# Patient Record
Sex: MALE | Race: WHITE | HISPANIC OR LATINO | Employment: STUDENT | URBAN - METROPOLITAN AREA
[De-identification: names, ages, dates, MRNs, and addresses within clinical notes are randomized per-mention and may not be internally consistent; named-entity substitution may affect disease eponyms.]

---
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
## 2022-01-07 ENCOUNTER — TELEPHONE (OUTPATIENT)
Dept: FAMILY MEDICINE | Facility: CLINIC | Age: 22
End: 2022-01-07
Payer: COMMERCIAL

## 2022-01-07 NOTE — TELEPHONE ENCOUNTER
Spoke with pt, he is having sob, when urinates it is foamy per pt statement, vision is blurry, no chest pain but feels heavy to take a deep breath, dry cough going on a few days. Pt is worried about symptoms. Spoke with provider he stated pt needed to be seen in ER. Pt will est care with Dr. Graves later on.

## 2022-01-10 ENCOUNTER — HOSPITAL ENCOUNTER (EMERGENCY)
Facility: HOSPITAL | Age: 22
Discharge: HOME | End: 2022-01-10
Attending: EMERGENCY MEDICINE
Payer: COMMERCIAL

## 2022-01-10 VITALS
WEIGHT: 193.88 LBS | BODY MASS INDEX: 33.1 KG/M2 | DIASTOLIC BLOOD PRESSURE: 82 MMHG | HEIGHT: 64 IN | TEMPERATURE: 99 F | HEART RATE: 90 BPM | SYSTOLIC BLOOD PRESSURE: 132 MMHG | OXYGEN SATURATION: 100 % | RESPIRATION RATE: 18 BRPM

## 2022-01-10 DIAGNOSIS — R00.0 RAPID HEART BEAT: ICD-10-CM

## 2022-01-10 DIAGNOSIS — R06.02 SOB (SHORTNESS OF BREATH): ICD-10-CM

## 2022-01-10 DIAGNOSIS — R00.2 PALPITATION: ICD-10-CM

## 2022-01-10 DIAGNOSIS — F41.9 ANXIETY: Primary | ICD-10-CM

## 2022-01-10 PROCEDURE — 99284 EMERGENCY DEPT VISIT MOD MDM: CPT | Mod: 25

## 2022-01-10 PROCEDURE — 93010 EKG 12-LEAD: ICD-10-PCS | Mod: ,,, | Performed by: INTERNAL MEDICINE

## 2022-01-10 PROCEDURE — 93010 ELECTROCARDIOGRAM REPORT: CPT | Mod: ,,, | Performed by: INTERNAL MEDICINE

## 2022-01-10 PROCEDURE — 93005 ELECTROCARDIOGRAM TRACING: CPT

## 2022-01-10 PROCEDURE — 25000003 PHARM REV CODE 250: Performed by: REGISTERED NURSE

## 2022-01-10 RX ORDER — HYDROXYZINE PAMOATE 25 MG/1
25 CAPSULE ORAL EVERY 6 HOURS PRN
Qty: 20 CAPSULE | Refills: 0 | Status: SHIPPED | OUTPATIENT
Start: 2022-01-10

## 2022-01-10 RX ORDER — ALPRAZOLAM 0.5 MG/1
1 TABLET ORAL
Status: COMPLETED | OUTPATIENT
Start: 2022-01-10 | End: 2022-01-10

## 2022-01-10 RX ADMIN — ALPRAZOLAM 1 MG: 0.5 TABLET ORAL at 08:01

## 2022-01-11 NOTE — ED PROVIDER NOTES
Encounter Date: 1/10/2022       History     Chief Complaint   Patient presents with    Anxiety     States has been having SOB at night/cough. States that he feels like his heart beats funny at night.     The history is provided by the patient.   Palpitations   This is a new problem. The current episode started several weeks ago. The problem occurs intermittently. The problem has been unchanged. The problem is associated with stress and anxiety. Associated symptoms include chest pressure, irregular heartbeat, cough and shortness of breath. Pertinent negatives include no diaphoresis, no fever, no malaise/fatigue, no chest pain, no nausea, no back pain and no weakness.     Review of patient's allergies indicates:  No Known Allergies  History reviewed. No pertinent past medical history.  History reviewed. No pertinent surgical history.  History reviewed. No pertinent family history.  Social History     Tobacco Use    Smoking status: Passive Smoke Exposure - Never Smoker    Smokeless tobacco: Never Used   Substance Use Topics    Alcohol use: No    Drug use: Never     Review of Systems   Constitutional: Negative for diaphoresis, fever and malaise/fatigue.   HENT: Negative for sore throat.    Respiratory: Positive for cough and shortness of breath.    Cardiovascular: Positive for palpitations. Negative for chest pain.   Gastrointestinal: Negative for nausea.   Genitourinary: Negative for dysuria.   Musculoskeletal: Negative for back pain.   Skin: Negative for rash.   Neurological: Negative for weakness.   Hematological: Does not bruise/bleed easily.   All other systems reviewed and are negative.      Physical Exam     Initial Vitals [01/10/22 1923]   BP Pulse Resp Temp SpO2   (!) 154/90 (!) 120 12 99.5 °F (37.5 °C) 100 %      MAP       --         Physical Exam    Constitutional: He appears well-developed and well-nourished. No distress.   HENT:   Head: Normocephalic and atraumatic.   Nose: Nose normal.   Mouth/Throat:  Uvula is midline and oropharynx is clear and moist.   Eyes: Conjunctivae and EOM are normal. Pupils are equal, round, and reactive to light.   Neck: Neck supple.   Normal range of motion.  Cardiovascular: Normal rate and regular rhythm.   Pulmonary/Chest: Effort normal and breath sounds normal. No respiratory distress. He has no decreased breath sounds. He has no wheezes. He has no rales.   Abdominal: Abdomen is soft. Normal appearance and bowel sounds are normal. There is no abdominal tenderness.   Musculoskeletal:         General: Normal range of motion.      Cervical back: Normal range of motion and neck supple.     Neurological: He is alert and oriented to person, place, and time. He has normal strength. GCS eye subscore is 4. GCS verbal subscore is 5. GCS motor subscore is 6.   Skin: Skin is warm and dry. Capillary refill takes less than 2 seconds. No rash noted.   Psychiatric: His speech is normal and behavior is normal. His mood appears anxious.         ED Course   Procedures  Labs Reviewed - No data to display  EKG Readings: (Independently Interpreted)   Initial Reading: No STEMI. Rhythm: Sinus Tachycardia. Heart Rate: 114.       Imaging Results          X-Ray Chest 1 View (Final result)  Result time 01/10/22 19:41:16    Final result by Jose Curtis MD (01/10/22 19:41:16)                 Impression:      No acute abnormality.      Electronically signed by: Jose Curtis  Date:    01/10/2022  Time:    19:41             Narrative:    EXAMINATION:  XR CHEST 1 VIEW    CLINICAL HISTORY:  Palpitations    TECHNIQUE:  Single frontal view of the chest was performed.    COMPARISON:  01/28/2018.    FINDINGS:  The lungs are clear, with normal appearance of pulmonary vasculature and no pleural effusion or pneumothorax.    The cardiac silhouette is normal in size. The hilar and mediastinal contours are unremarkable.    Bones are intact.                                 Medications   ALPRAZolam tablet 1 mg (has no  administration in time range)          I discussed with patient and/or family/caretaker that evaluation in the ED does not suggest any emergent or life threatening medical conditions requiring immediate intervention beyond what was provided in the ED, and I believe patient is safe for discharge.  Regardless, an unremarkable evaluation in the ED does not preclude the development or presence of a serious of life threatening condition. As such, patient was instructed to return immediately for any worsening or change in current symptoms.                   Clinical Impression:   Final diagnoses:  [R00.2] Palpitation  [F41.9] Anxiety (Primary)  [R06.02] SOB (shortness of breath)          ED Disposition Condition    Discharge Stable        ED Prescriptions     Medication Sig Dispense Start Date End Date Auth. Provider    hydrOXYzine pamoate (VISTARIL) 25 MG Cap Take 1 capsule (25 mg total) by mouth every 6 (six) hours as needed (Anxiety). 20 capsule 1/10/2022  Ventura Pal Jr., FNP        Follow-up Information     Follow up With Specialties Details Why Contact Info    [facility] - Emergency Dept. Emergency Medicine  If symptoms worsen [address] [facility] [address] [phone]           Ventura Pal Jr., HEMAP  01/11/22 9111

## 2022-01-11 NOTE — ED NOTES
Patient identifiers verified and correct for Maninder Sims.    Pt presents with anxiety.     LOC: The patient is awake, alert and aware of environment with an appropriate affect, the patient is oriented x 3 and speaking appropriately.  APPEARANCE: Patient resting comfortably and in no acute distress, patient is clean and well groomed, patient's clothing is properly fastened.  SKIN: The skin is warm and dry, color consistent with ethnicity, patient has normal skin turgor and moist mucus membranes, skin intact, no breakdown or bruising noted.  MUSCULOSKELETAL: Patient moving all extremities spontaneously.  RESPIRATORY: Airway is open and patent, respirations are spontaneous.  CARDIAC: Patient has a normal rate, no periphreal edema noted, capillary refill < 3 seconds.  ABDOMEN: Soft and non tender to palpation.